# Patient Record
Sex: MALE | Race: WHITE | NOT HISPANIC OR LATINO | Employment: OTHER | URBAN - METROPOLITAN AREA
[De-identification: names, ages, dates, MRNs, and addresses within clinical notes are randomized per-mention and may not be internally consistent; named-entity substitution may affect disease eponyms.]

---

## 2017-10-24 ENCOUNTER — APPOINTMENT (OUTPATIENT)
Dept: SOCIAL WORK | Facility: CLINIC | Age: 72
End: 2017-10-24

## 2017-10-24 PROCEDURE — 90670 PCV13 VACCINE IM: CPT | Performed by: REGISTERED NURSE

## 2017-10-24 PROCEDURE — 90662 IIV NO PRSV INCREASED AG IM: CPT | Performed by: REGISTERED NURSE

## 2020-02-18 ENCOUNTER — HOSPITAL ENCOUNTER (OUTPATIENT)
Dept: HOSPITAL 8 - OUT | Age: 75
Discharge: HOME | End: 2020-02-18
Attending: ORTHOPAEDIC SURGERY
Payer: MEDICARE

## 2020-02-18 VITALS — BODY MASS INDEX: 30.31 KG/M2 | HEIGHT: 68 IN | WEIGHT: 199.96 LBS

## 2020-02-18 VITALS — DIASTOLIC BLOOD PRESSURE: 85 MMHG | SYSTOLIC BLOOD PRESSURE: 125 MMHG

## 2020-02-18 DIAGNOSIS — S83.242A: Primary | ICD-10-CM
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
## 2020-02-18 DIAGNOSIS — M17.12: ICD-10-CM
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
## 2020-02-18 DIAGNOSIS — M65.862: ICD-10-CM

## 2020-02-18 DIAGNOSIS — M94.8X6: ICD-10-CM
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
## 2020-02-18 LAB
ALBUMIN SERPL-MCNC: 3.6 G/DL (ref 3.4–5)
ALP SERPL-CCNC: 65 U/L (ref 45–117)
ALT SERPL-CCNC: 35 U/L (ref 12–78)
ANION GAP SERPL CALC-SCNC: 7 MMOL/L (ref 5–15)
BILIRUB SERPL-MCNC: 0.7 MG/DL (ref 0.2–1)
CALCIUM SERPL-MCNC: 8.4 MG/DL (ref 8.5–10.1)
CHLORIDE SERPL-SCNC: 105 MMOL/L (ref 98–107)
CREAT SERPL-MCNC: 1.05 MG/DL (ref 0.7–1.3)
PROT SERPL-MCNC: 7.6 G/DL (ref 6.4–8.2)

## 2020-02-18 PROCEDURE — 29881 ARTHRS KNE SRG MNISECTMY M/L: CPT

## 2020-02-18 PROCEDURE — 88300 SURGICAL PATH GROSS: CPT

## 2020-02-18 PROCEDURE — 80053 COMPREHEN METABOLIC PANEL: CPT

## 2020-02-18 PROCEDURE — 36415 COLL VENOUS BLD VENIPUNCTURE: CPT

## 2020-02-18 PROCEDURE — 93005 ELECTROCARDIOGRAM TRACING: CPT

## 2021-01-15 DIAGNOSIS — Z23 NEED FOR VACCINATION: ICD-10-CM

## 2021-02-16 ENCOUNTER — IMMUNIZATION (OUTPATIENT)
Dept: FAMILY PLANNING/WOMEN'S HEALTH CLINIC | Facility: IMMUNIZATION CENTER | Age: 76
End: 2021-02-16
Attending: INTERNAL MEDICINE

## 2021-02-16 DIAGNOSIS — Z23 ENCOUNTER FOR VACCINATION: Primary | ICD-10-CM

## 2021-02-16 DIAGNOSIS — Z23 NEED FOR VACCINATION: ICD-10-CM

## 2021-02-16 PROCEDURE — 91300 PFIZER SARS-COV-2 VACCINE: CPT | Performed by: INTERNAL MEDICINE

## 2021-02-16 PROCEDURE — 0001A PFIZER SARS-COV-2 VACCINE: CPT | Performed by: INTERNAL MEDICINE

## 2021-03-06 ENCOUNTER — IMMUNIZATION (OUTPATIENT)
Dept: FAMILY PLANNING/WOMEN'S HEALTH CLINIC | Facility: IMMUNIZATION CENTER | Age: 76
End: 2021-03-06
Attending: INTERNAL MEDICINE
Payer: COMMERCIAL

## 2021-03-06 DIAGNOSIS — Z23 ENCOUNTER FOR VACCINATION: Primary | ICD-10-CM

## 2021-03-06 PROCEDURE — 91300 PFIZER SARS-COV-2 VACCINE: CPT

## 2021-03-06 PROCEDURE — 0002A PFIZER SARS-COV-2 VACCINE: CPT

## 2021-03-09 ENCOUNTER — APPOINTMENT (OUTPATIENT)
Dept: LAB | Facility: MEDICAL CENTER | Age: 76
End: 2021-03-09
Payer: COMMERCIAL

## 2021-04-06 ENCOUNTER — HOSPITAL ENCOUNTER (OUTPATIENT)
Dept: LAB | Facility: MEDICAL CENTER | Age: 76
End: 2021-04-06
Attending: INTERNAL MEDICINE
Payer: COMMERCIAL

## 2021-04-06 LAB — CREAT UR-MCNC: 147.34 MG/DL

## 2021-04-06 PROCEDURE — 84443 ASSAY THYROID STIM HORMONE: CPT

## 2021-04-06 PROCEDURE — 36415 COLL VENOUS BLD VENIPUNCTURE: CPT

## 2021-04-06 PROCEDURE — 82570 ASSAY OF URINE CREATININE: CPT

## 2021-04-06 PROCEDURE — 80053 COMPREHEN METABOLIC PANEL: CPT

## 2021-04-06 PROCEDURE — 84100 ASSAY OF PHOSPHORUS: CPT

## 2021-04-06 PROCEDURE — 84481 FREE ASSAY (FT-3): CPT

## 2021-04-06 PROCEDURE — 83036 HEMOGLOBIN GLYCOSYLATED A1C: CPT

## 2021-04-06 PROCEDURE — 84439 ASSAY OF FREE THYROXINE: CPT

## 2021-04-06 PROCEDURE — 84105 ASSAY OF URINE PHOSPHORUS: CPT

## 2021-04-06 PROCEDURE — 82340 ASSAY OF CALCIUM IN URINE: CPT

## 2021-04-07 LAB
ALBUMIN SERPL BCP-MCNC: 4.1 G/DL (ref 3.2–4.9)
ALBUMIN/GLOB SERPL: 1.5 G/DL
ALP SERPL-CCNC: 62 U/L (ref 30–99)
ALT SERPL-CCNC: 26 U/L (ref 2–50)
ANION GAP SERPL CALC-SCNC: 9 MMOL/L (ref 7–16)
AST SERPL-CCNC: 22 U/L (ref 12–45)
BILIRUB SERPL-MCNC: 0.6 MG/DL (ref 0.1–1.5)
BUN SERPL-MCNC: 15 MG/DL (ref 8–22)
CALCIUM SERPL-MCNC: 9.1 MG/DL (ref 8.5–10.5)
CHLORIDE SERPL-SCNC: 102 MMOL/L (ref 96–112)
CO2 SERPL-SCNC: 27 MMOL/L (ref 20–33)
CREAT SERPL-MCNC: 0.93 MG/DL (ref 0.5–1.4)
EST. AVERAGE GLUCOSE BLD GHB EST-MCNC: 131 MG/DL
GLOBULIN SER CALC-MCNC: 2.8 G/DL (ref 1.9–3.5)
GLUCOSE SERPL-MCNC: 93 MG/DL (ref 65–99)
HBA1C MFR BLD: 6.2 % (ref 4–5.6)
PHOSPHATE SERPL-MCNC: 3 MG/DL (ref 2.5–4.5)
POTASSIUM SERPL-SCNC: 4.8 MMOL/L (ref 3.6–5.5)
PROT SERPL-MCNC: 6.9 G/DL (ref 6–8.2)
SODIUM SERPL-SCNC: 138 MMOL/L (ref 135–145)
T3FREE SERPL-MCNC: 2.71 PG/ML (ref 2–4.4)
T4 FREE SERPL-MCNC: 1.63 NG/DL (ref 0.93–1.7)
TSH SERPL DL<=0.005 MIU/L-ACNC: 0.94 UIU/ML (ref 0.38–5.33)

## 2021-04-08 LAB
CALCIUM 24H UR-MCNC: 12.9 MG/DL
CALCIUM 24H UR-MRATE: NORMAL MG/D
CALCIUM/CREAT 24H UR: 91 MG/G (ref 20–240)
COLLECT DURATION TIME SPEC: NORMAL HRS
COLLECT DURATION TIME SPEC: NORMAL HRS
CREAT 24H UR-MCNC: 141 MG/DL
CREAT 24H UR-MRATE: NORMAL MG/D (ref 800–2100)
PHOSPHATE 24H UR-MCNC: 59 MG/DL
PHOSPHATE 24H UR-MRATE: NORMAL MG/D (ref 400–1300)
PHOSPHATE/CREAT 24H UR: 418 MG/G
SPECIMEN VOL ?TM UR: NORMAL ML
TOTAL VOLUME 1105: NORMAL ML

## 2021-07-01 ENCOUNTER — HOSPITAL ENCOUNTER (OUTPATIENT)
Dept: LAB | Facility: MEDICAL CENTER | Age: 76
End: 2021-07-01
Attending: INTERNAL MEDICINE
Payer: COMMERCIAL

## 2021-07-01 LAB
EST. AVERAGE GLUCOSE BLD GHB EST-MCNC: 117 MG/DL
HBA1C MFR BLD: 5.7 % (ref 4–5.6)

## 2021-07-01 PROCEDURE — 83036 HEMOGLOBIN GLYCOSYLATED A1C: CPT

## 2021-07-01 PROCEDURE — 36415 COLL VENOUS BLD VENIPUNCTURE: CPT

## 2021-10-26 ENCOUNTER — HOSPITAL ENCOUNTER (OUTPATIENT)
Dept: LAB | Facility: MEDICAL CENTER | Age: 76
End: 2021-10-26
Attending: INTERNAL MEDICINE
Payer: COMMERCIAL

## 2021-10-26 LAB
ALBUMIN SERPL BCP-MCNC: 4 G/DL (ref 3.2–4.9)
ALBUMIN/GLOB SERPL: 1.4 G/DL
ALP SERPL-CCNC: 58 U/L (ref 30–99)
ALT SERPL-CCNC: 34 U/L (ref 2–50)
ANION GAP SERPL CALC-SCNC: 10 MMOL/L (ref 7–16)
AST SERPL-CCNC: 21 U/L (ref 12–45)
BILIRUB SERPL-MCNC: 0.7 MG/DL (ref 0.1–1.5)
BUN SERPL-MCNC: 18 MG/DL (ref 8–22)
CALCIUM SERPL-MCNC: 8.6 MG/DL (ref 8.5–10.5)
CHLORIDE SERPL-SCNC: 100 MMOL/L (ref 96–112)
CO2 SERPL-SCNC: 27 MMOL/L (ref 20–33)
CREAT SERPL-MCNC: 1.03 MG/DL (ref 0.5–1.4)
EST. AVERAGE GLUCOSE BLD GHB EST-MCNC: 117 MG/DL
GLOBULIN SER CALC-MCNC: 2.8 G/DL (ref 1.9–3.5)
GLUCOSE SERPL-MCNC: 95 MG/DL (ref 65–99)
HBA1C MFR BLD: 5.7 % (ref 4–5.6)
POTASSIUM SERPL-SCNC: 4.4 MMOL/L (ref 3.6–5.5)
PROT SERPL-MCNC: 6.8 G/DL (ref 6–8.2)
SODIUM SERPL-SCNC: 137 MMOL/L (ref 135–145)

## 2021-10-26 PROCEDURE — 80053 COMPREHEN METABOLIC PANEL: CPT

## 2021-10-26 PROCEDURE — 36415 COLL VENOUS BLD VENIPUNCTURE: CPT

## 2021-10-26 PROCEDURE — 83036 HEMOGLOBIN GLYCOSYLATED A1C: CPT

## 2022-01-26 ENCOUNTER — HOSPITAL ENCOUNTER (OUTPATIENT)
Dept: LAB | Facility: MEDICAL CENTER | Age: 77
End: 2022-01-26
Attending: INTERNAL MEDICINE
Payer: MEDICARE

## 2022-01-26 LAB
ALBUMIN SERPL BCP-MCNC: 4.4 G/DL (ref 3.2–4.9)
ALBUMIN/GLOB SERPL: 1.8 G/DL
ALP SERPL-CCNC: 66 U/L (ref 30–99)
ALT SERPL-CCNC: 34 U/L (ref 2–50)
ANION GAP SERPL CALC-SCNC: 14 MMOL/L (ref 7–16)
AST SERPL-CCNC: 26 U/L (ref 12–45)
BILIRUB SERPL-MCNC: 0.4 MG/DL (ref 0.1–1.5)
BUN SERPL-MCNC: 17 MG/DL (ref 8–22)
CALCIUM SERPL-MCNC: 8.6 MG/DL (ref 8.5–10.5)
CHLORIDE SERPL-SCNC: 99 MMOL/L (ref 96–112)
CO2 SERPL-SCNC: 24 MMOL/L (ref 20–33)
CREAT SERPL-MCNC: 1.02 MG/DL (ref 0.5–1.4)
EST. AVERAGE GLUCOSE BLD GHB EST-MCNC: 114 MG/DL
GLOBULIN SER CALC-MCNC: 2.5 G/DL (ref 1.9–3.5)
GLUCOSE SERPL-MCNC: 92 MG/DL (ref 65–99)
HBA1C MFR BLD: 5.6 % (ref 4–5.6)
POTASSIUM SERPL-SCNC: 4.3 MMOL/L (ref 3.6–5.5)
PROT SERPL-MCNC: 6.9 G/DL (ref 6–8.2)
SODIUM SERPL-SCNC: 137 MMOL/L (ref 135–145)

## 2022-01-26 PROCEDURE — 36415 COLL VENOUS BLD VENIPUNCTURE: CPT

## 2022-01-26 PROCEDURE — 83036 HEMOGLOBIN GLYCOSYLATED A1C: CPT | Mod: GA

## 2022-01-26 PROCEDURE — 80053 COMPREHEN METABOLIC PANEL: CPT

## 2022-05-11 ENCOUNTER — HOSPITAL ENCOUNTER (OUTPATIENT)
Dept: LAB | Facility: MEDICAL CENTER | Age: 77
End: 2022-05-11
Attending: INTERNAL MEDICINE
Payer: MEDICARE

## 2022-05-11 PROCEDURE — 82570 ASSAY OF URINE CREATININE: CPT

## 2022-05-11 PROCEDURE — 84300 ASSAY OF URINE SODIUM: CPT

## 2022-05-11 PROCEDURE — 36415 COLL VENOUS BLD VENIPUNCTURE: CPT | Mod: GA

## 2022-05-11 PROCEDURE — 84100 ASSAY OF PHOSPHORUS: CPT

## 2022-05-11 PROCEDURE — 80053 COMPREHEN METABOLIC PANEL: CPT

## 2022-05-11 PROCEDURE — 86141 C-REACTIVE PROTEIN HS: CPT | Mod: GA

## 2022-05-11 PROCEDURE — 84133 ASSAY OF URINE POTASSIUM: CPT

## 2022-05-11 PROCEDURE — 84105 ASSAY OF URINE PHOSPHORUS: CPT

## 2022-05-11 PROCEDURE — 82340 ASSAY OF CALCIUM IN URINE: CPT

## 2022-05-12 LAB
ALBUMIN SERPL BCP-MCNC: 4 G/DL (ref 3.2–4.9)
ALBUMIN/GLOB SERPL: 1.4 G/DL
ALP SERPL-CCNC: 58 U/L (ref 30–99)
ALT SERPL-CCNC: 31 U/L (ref 2–50)
ANION GAP SERPL CALC-SCNC: 10 MMOL/L (ref 7–16)
AST SERPL-CCNC: 25 U/L (ref 12–45)
BILIRUB SERPL-MCNC: 0.6 MG/DL (ref 0.1–1.5)
BUN SERPL-MCNC: 18 MG/DL (ref 8–22)
CALCIUM SERPL-MCNC: 8.7 MG/DL (ref 8.5–10.5)
CHLORIDE SERPL-SCNC: 102 MMOL/L (ref 96–112)
CO2 SERPL-SCNC: 27 MMOL/L (ref 20–33)
CREAT SERPL-MCNC: 1.02 MG/DL (ref 0.5–1.4)
CREAT UR-MCNC: 104.35 MG/DL
CRP SERPL HS-MCNC: 3.2 MG/L (ref 0–3)
GFR SERPLBLD CREATININE-BSD FMLA CKD-EPI: 76 ML/MIN/1.73 M 2
GLOBULIN SER CALC-MCNC: 2.9 G/DL (ref 1.9–3.5)
GLUCOSE SERPL-MCNC: 106 MG/DL (ref 65–99)
PHOSPHATE SERPL-MCNC: 4 MG/DL (ref 2.5–4.5)
POTASSIUM SERPL-SCNC: 5 MMOL/L (ref 3.6–5.5)
POTASSIUM UR-SCNC: 73 MMOL/L
PROT SERPL-MCNC: 6.9 G/DL (ref 6–8.2)
SODIUM SERPL-SCNC: 139 MMOL/L (ref 135–145)
SODIUM UR-SCNC: 118 MMOL/L

## 2022-05-14 LAB
CALCIUM 24H UR-MCNC: 14.1 MG/DL
CALCIUM 24H UR-MRATE: NORMAL MG/D (ref 100–250)
CALCIUM/CREAT 24H UR: 131 MG/G (ref 20–240)
COLLECT DURATION TIME SPEC: NORMAL HRS
COLLECT DURATION TIME SPEC: NORMAL HRS
CREAT 24H UR-MCNC: 108 MG/DL
CREAT 24H UR-MRATE: NORMAL MG/D (ref 800–2100)
PHOSPHATE 24H UR-MCNC: 45 MG/DL
PHOSPHATE 24H UR-MRATE: NORMAL MG/D (ref 400–1300)
PHOSPHATE/CREAT 24H UR: 417 MG/G
SPECIMEN VOL ?TM UR: NORMAL ML
TOTAL VOLUME 1105: NORMAL ML

## 2022-09-08 ENCOUNTER — HOSPITAL ENCOUNTER (OUTPATIENT)
Dept: LAB | Facility: MEDICAL CENTER | Age: 77
End: 2022-09-08
Attending: INTERNAL MEDICINE
Payer: MEDICARE

## 2022-09-08 LAB
EST. AVERAGE GLUCOSE BLD GHB EST-MCNC: 114 MG/DL
HBA1C MFR BLD: 5.6 % (ref 4–5.6)
T3FREE SERPL-MCNC: 2.52 PG/ML (ref 2–4.4)
T4 FREE SERPL-MCNC: 1.68 NG/DL (ref 0.93–1.7)
TSH SERPL DL<=0.005 MIU/L-ACNC: 2.22 UIU/ML (ref 0.38–5.33)

## 2022-09-08 PROCEDURE — 83036 HEMOGLOBIN GLYCOSYLATED A1C: CPT | Mod: GA

## 2022-09-08 PROCEDURE — 84439 ASSAY OF FREE THYROXINE: CPT

## 2022-09-08 PROCEDURE — 36415 COLL VENOUS BLD VENIPUNCTURE: CPT | Mod: GA

## 2022-09-08 PROCEDURE — 84481 FREE ASSAY (FT-3): CPT

## 2022-09-08 PROCEDURE — 84443 ASSAY THYROID STIM HORMONE: CPT

## 2022-11-03 ENCOUNTER — HOSPITAL ENCOUNTER (OUTPATIENT)
Dept: LAB | Facility: MEDICAL CENTER | Age: 77
End: 2022-11-03
Attending: INTERNAL MEDICINE
Payer: MEDICARE

## 2022-11-03 LAB
ALBUMIN SERPL BCP-MCNC: 4.1 G/DL (ref 3.2–4.9)
ALBUMIN/GLOB SERPL: 1.3 G/DL
ALP SERPL-CCNC: 62 U/L (ref 30–99)
ALT SERPL-CCNC: 41 U/L (ref 2–50)
ANION GAP SERPL CALC-SCNC: 12 MMOL/L (ref 7–16)
AST SERPL-CCNC: 27 U/L (ref 12–45)
BILIRUB SERPL-MCNC: 0.5 MG/DL (ref 0.1–1.5)
BUN SERPL-MCNC: 19 MG/DL (ref 8–22)
CALCIUM SERPL-MCNC: 9.1 MG/DL (ref 8.5–10.5)
CHLORIDE SERPL-SCNC: 98 MMOL/L (ref 96–112)
CO2 SERPL-SCNC: 27 MMOL/L (ref 20–33)
CREAT SERPL-MCNC: 1.23 MG/DL (ref 0.5–1.4)
CREAT UR-MCNC: 184.62 MG/DL
GFR SERPLBLD CREATININE-BSD FMLA CKD-EPI: 61 ML/MIN/1.73 M 2
GLOBULIN SER CALC-MCNC: 3.1 G/DL (ref 1.9–3.5)
GLUCOSE SERPL-MCNC: 111 MG/DL (ref 65–99)
PHOSPHATE SERPL-MCNC: 3.4 MG/DL (ref 2.5–4.5)
POTASSIUM SERPL-SCNC: 4.1 MMOL/L (ref 3.6–5.5)
POTASSIUM UR-SCNC: 68 MMOL/L
PROT SERPL-MCNC: 7.2 G/DL (ref 6–8.2)
PTH-INTACT SERPL-MCNC: 33.4 PG/ML (ref 14–72)
SODIUM SERPL-SCNC: 137 MMOL/L (ref 135–145)
SODIUM UR-SCNC: 108 MMOL/L

## 2022-11-03 PROCEDURE — 82570 ASSAY OF URINE CREATININE: CPT

## 2022-11-03 PROCEDURE — 82340 ASSAY OF CALCIUM IN URINE: CPT

## 2022-11-03 PROCEDURE — 84100 ASSAY OF PHOSPHORUS: CPT

## 2022-11-03 PROCEDURE — 84300 ASSAY OF URINE SODIUM: CPT

## 2022-11-03 PROCEDURE — 80053 COMPREHEN METABOLIC PANEL: CPT

## 2022-11-03 PROCEDURE — 82088 ASSAY OF ALDOSTERONE: CPT

## 2022-11-03 PROCEDURE — 83970 ASSAY OF PARATHORMONE: CPT

## 2022-11-03 PROCEDURE — 84105 ASSAY OF URINE PHOSPHORUS: CPT

## 2022-11-03 PROCEDURE — 36415 COLL VENOUS BLD VENIPUNCTURE: CPT

## 2022-11-03 PROCEDURE — 84133 ASSAY OF URINE POTASSIUM: CPT

## 2022-11-04 ENCOUNTER — PATIENT MESSAGE (OUTPATIENT)
Dept: HEALTH INFORMATION MANAGEMENT | Facility: OTHER | Age: 77
End: 2022-11-04

## 2022-11-05 LAB
ALDOST SERPL-MCNC: 8.2 NG/DL
CALCIUM 24H UR-MCNC: 20.7 MG/DL
CALCIUM 24H UR-MRATE: NORMAL MG/D (ref 100–250)
CALCIUM/CREAT 24H UR: 102 MG/G (ref 20–240)
COLLECT DURATION TIME SPEC: NORMAL HRS
COLLECT DURATION TIME SPEC: NORMAL HRS
CREAT 24H UR-MCNC: 203 MG/DL
CREAT 24H UR-MRATE: NORMAL MG/D (ref 800–2100)
PHOSPHATE 24H UR-MCNC: 80 MG/DL
PHOSPHATE 24H UR-MRATE: NORMAL MG/D (ref 400–1300)
PHOSPHATE/CREAT 24H UR: 394 MG/G
SPECIMEN VOL ?TM UR: NORMAL ML
TOTAL VOLUME 1105: NORMAL ML

## 2022-11-24 ENCOUNTER — HOSPITAL ENCOUNTER (OUTPATIENT)
Facility: MEDICAL CENTER | Age: 77
End: 2022-11-24
Attending: FAMILY MEDICINE
Payer: MEDICARE

## 2022-11-24 ENCOUNTER — OFFICE VISIT (OUTPATIENT)
Dept: URGENT CARE | Facility: CLINIC | Age: 77
End: 2022-11-24
Payer: MEDICARE

## 2022-11-24 VITALS
WEIGHT: 200 LBS | HEART RATE: 60 BPM | HEIGHT: 68 IN | SYSTOLIC BLOOD PRESSURE: 146 MMHG | TEMPERATURE: 97.4 F | BODY MASS INDEX: 30.31 KG/M2 | RESPIRATION RATE: 16 BRPM | OXYGEN SATURATION: 98 % | DIASTOLIC BLOOD PRESSURE: 66 MMHG

## 2022-11-24 DIAGNOSIS — R42 DIZZINESS: ICD-10-CM

## 2022-11-24 LAB
ALBUMIN SERPL BCP-MCNC: 4.4 G/DL (ref 3.2–4.9)
ALBUMIN/GLOB SERPL: 1.5 G/DL
ALP SERPL-CCNC: 63 U/L (ref 30–99)
ALT SERPL-CCNC: 36 U/L (ref 2–50)
ANION GAP SERPL CALC-SCNC: 12 MMOL/L (ref 7–16)
APPEARANCE UR: CLEAR
AST SERPL-CCNC: 34 U/L (ref 12–45)
BASOPHILS # BLD AUTO: 0.3 % (ref 0–1.8)
BASOPHILS # BLD: 0.02 K/UL (ref 0–0.12)
BILIRUB SERPL-MCNC: 0.5 MG/DL (ref 0.1–1.5)
BILIRUB UR STRIP-MCNC: NEGATIVE MG/DL
BUN SERPL-MCNC: 17 MG/DL (ref 8–22)
CALCIUM SERPL-MCNC: 9.5 MG/DL (ref 8.5–10.5)
CHLORIDE SERPL-SCNC: 98 MMOL/L (ref 96–112)
CO2 SERPL-SCNC: 25 MMOL/L (ref 20–33)
COLOR UR AUTO: NORMAL
CREAT SERPL-MCNC: 1.06 MG/DL (ref 0.5–1.4)
EOSINOPHIL # BLD AUTO: 0.03 K/UL (ref 0–0.51)
EOSINOPHIL NFR BLD: 0.5 % (ref 0–6.9)
ERYTHROCYTE [DISTWIDTH] IN BLOOD BY AUTOMATED COUNT: 48.3 FL (ref 35.9–50)
GFR SERPLBLD CREATININE-BSD FMLA CKD-EPI: 73 ML/MIN/1.73 M 2
GLOBULIN SER CALC-MCNC: 2.9 G/DL (ref 1.9–3.5)
GLUCOSE SERPL-MCNC: 98 MG/DL (ref 65–99)
GLUCOSE UR STRIP.AUTO-MCNC: NEGATIVE MG/DL
HCT VFR BLD AUTO: 45.8 % (ref 42–52)
HGB BLD-MCNC: 16 G/DL (ref 14–18)
IMM GRANULOCYTES # BLD AUTO: 0.02 K/UL (ref 0–0.11)
IMM GRANULOCYTES NFR BLD AUTO: 0.3 % (ref 0–0.9)
KETONES UR STRIP.AUTO-MCNC: NEGATIVE MG/DL
LEUKOCYTE ESTERASE UR QL STRIP.AUTO: NEGATIVE
LYMPHOCYTES # BLD AUTO: 1.67 K/UL (ref 1–4.8)
LYMPHOCYTES NFR BLD: 27.2 % (ref 22–41)
MCH RBC QN AUTO: 34.7 PG (ref 27–33)
MCHC RBC AUTO-ENTMCNC: 34.9 G/DL (ref 33.7–35.3)
MCV RBC AUTO: 99.3 FL (ref 81.4–97.8)
MONOCYTES # BLD AUTO: 0.55 K/UL (ref 0–0.85)
MONOCYTES NFR BLD AUTO: 9 % (ref 0–13.4)
NEUTROPHILS # BLD AUTO: 3.84 K/UL (ref 1.82–7.42)
NEUTROPHILS NFR BLD: 62.7 % (ref 44–72)
NITRITE UR QL STRIP.AUTO: NEGATIVE
NRBC # BLD AUTO: 0 K/UL
NRBC BLD-RTO: 0 /100 WBC
NT-PROBNP SERPL IA-MCNC: 59 PG/ML (ref 0–125)
PH UR STRIP.AUTO: 6 [PH] (ref 5–8)
PLATELET # BLD AUTO: 260 K/UL (ref 164–446)
PMV BLD AUTO: 9.8 FL (ref 9–12.9)
POTASSIUM SERPL-SCNC: 4.3 MMOL/L (ref 3.6–5.5)
PROT SERPL-MCNC: 7.3 G/DL (ref 6–8.2)
PROT UR QL STRIP: NEGATIVE MG/DL
RBC # BLD AUTO: 4.61 M/UL (ref 4.7–6.1)
RBC UR QL AUTO: NEGATIVE
SODIUM SERPL-SCNC: 135 MMOL/L (ref 135–145)
SP GR UR STRIP.AUTO: 1.02
UROBILINOGEN UR STRIP-MCNC: 0.2 MG/DL
WBC # BLD AUTO: 6.1 K/UL (ref 4.8–10.8)

## 2022-11-24 PROCEDURE — 99204 OFFICE O/P NEW MOD 45 MIN: CPT | Performed by: FAMILY MEDICINE

## 2022-11-24 PROCEDURE — 83880 ASSAY OF NATRIURETIC PEPTIDE: CPT | Mod: GA

## 2022-11-24 PROCEDURE — 85025 COMPLETE CBC W/AUTO DIFF WBC: CPT

## 2022-11-24 PROCEDURE — 80053 COMPREHEN METABOLIC PANEL: CPT

## 2022-11-24 PROCEDURE — 81002 URINALYSIS NONAUTO W/O SCOPE: CPT | Performed by: FAMILY MEDICINE

## 2022-11-24 RX ORDER — CLONIDINE HYDROCHLORIDE 0.1 MG/1
0.1 TABLET ORAL
COMMUNITY
Start: 2010-01-01

## 2022-11-24 RX ORDER — VALSARTAN AND HYDROCHLOROTHIAZIDE 80; 12.5 MG/1; MG/1
1 TABLET, FILM COATED ORAL DAILY
COMMUNITY
Start: 2010-01-01

## 2022-11-24 RX ORDER — CALCITONIN SALMON 200 [IU]/.09ML
SPRAY, METERED NASAL
COMMUNITY
Start: 2022-10-12 | End: 2023-01-04

## 2022-11-24 RX ORDER — LEVOTHYROXINE SODIUM 137 UG/1
137 TABLET ORAL
COMMUNITY
Start: 1983-01-01

## 2022-11-24 RX ORDER — ERGOCALCIFEROL 1.25 MG/1
50000 CAPSULE ORAL
COMMUNITY
Start: 2010-01-01

## 2022-11-24 ASSESSMENT — ENCOUNTER SYMPTOMS
ABDOMINAL PAIN: 0
COUGH: 0
PALPITATIONS: 0
NAUSEA: 0
FEVER: 0
HEADACHES: 0
DIZZINESS: 1
VOMITING: 0
SORE THROAT: 0

## 2022-11-24 NOTE — PROGRESS NOTES
"Subjective:     Paco Amaya is a 77 y.o. male who presents for Dizziness (X2 days, )    HPI  Pt presents for evaluation of an acute problem  Pt with some dizziness the past 2 days   Feels his balance is poor   No sensation that the room is spinning and has no visual changes   Patient has a hard time describing his \"dizzy\" sensation  States he \"just does not feel right\"  Patient chronically has tinnitus unilaterally with no hearing loss  Patient saw PCP about the same problem yesterday and suggested it may be vertigo  Had EKG which was normal  Had point-of-care glucose which was 106  Was told to take over-the-counter meclizine    Review of Systems   Constitutional:  Negative for fever.   HENT:  Negative for sore throat.    Respiratory:  Negative for cough.    Cardiovascular:  Negative for chest pain and palpitations.   Gastrointestinal:  Negative for abdominal pain, nausea and vomiting.   Skin:  Negative for rash.   Neurological:  Positive for dizziness. Negative for headaches.     PMH:  has no past medical history on file.  MEDS:   Current Outpatient Medications:     levothyroxine (SYNTHROID) 137 MCG Tab, , Disp: , Rfl:     valsartan-hydrochlorothiazide (DIOVAN-HCT) 80-12.5 MG per tablet, , Disp: , Rfl:     ergocalciferol (DRISDOL) 75474 UNIT capsule, , Disp: , Rfl:     cloNIDine (CATAPRES) 0.1 MG Tab, , Disp: , Rfl:     calcitonin, salmon, (MIACALCIN) 200 UNIT/ACT Solution, SPRAY ONCE IN ONE NOSTRIL ONCE NIGHTLY, Disp: , Rfl:   ALLERGIES:   Allergies   Allergen Reactions    Clindamycin     Penicillins      SURGHX: History reviewed. No pertinent surgical history.  SOCHX:  reports that he has never smoked. He has never used smokeless tobacco.     Objective:   BP (!) 146/66 (BP Location: Left arm, Patient Position: Sitting, BP Cuff Size: Adult)   Pulse 60   Temp 36.3 °C (97.4 °F) (Temporal)   Resp 16   Ht 1.727 m (5' 8\")   Wt 90.7 kg (200 lb)   SpO2 98%   BMI 30.41 kg/m²     Physical " Exam  Constitutional:       General: He is not in acute distress.     Appearance: He is well-developed. He is not diaphoretic.   HENT:      Head: Normocephalic and atraumatic.      Right Ear: Tympanic membrane, ear canal and external ear normal.      Left Ear: Tympanic membrane, ear canal and external ear normal.      Nose: Nose normal.      Mouth/Throat:      Mouth: Mucous membranes are moist.      Pharynx: Oropharynx is clear. No oropharyngeal exudate or posterior oropharyngeal erythema.   Neck:      Trachea: No tracheal deviation.   Cardiovascular:      Rate and Rhythm: Normal rate and regular rhythm.      Heart sounds: Normal heart sounds.   Pulmonary:      Effort: Pulmonary effort is normal. No respiratory distress.      Breath sounds: Normal breath sounds. No wheezing or rales.   Musculoskeletal:         General: Normal range of motion.      Cervical back: Normal range of motion and neck supple. No tenderness.   Lymphadenopathy:      Cervical: No cervical adenopathy.   Skin:     General: Skin is warm and dry.      Findings: No rash.   Neurological:      Mental Status: He is alert.   Psychiatric:         Behavior: Behavior normal.         Thought Content: Thought content normal.         Judgment: Judgment normal.       Assessment/Plan:   Assessment    1. Dizziness  - CBC WITH DIFFERENTIAL; Future  - Comp Metabolic Panel; Future  - proBrain Natriuretic Peptide, NT; Future  - POCT Urinalysis    Other orders  - levothyroxine (SYNTHROID) 137 MCG Tab  - valsartan-hydrochlorothiazide (DIOVAN-HCT) 80-12.5 MG per tablet  - ergocalciferol (DRISDOL) 49397 UNIT capsule  - cloNIDine (CATAPRES) 0.1 MG Tab  - calcitonin, salmon, (MIACALCIN) 200 UNIT/ACT Solution; SPRAY ONCE IN ONE NOSTRIL ONCE NIGHTLY    Patient with intermittent dizziness and fatigue.  Had some work-up with his PCP including point-of-care glucose, EKG, and physical exam which were all within normal limits.  Here in office today, orthostatics blood pressures  normal, point-of-care urinalysis normal, and recommended further evaluation with blood testing.  Differential could include BPPV, Ménière's, anemia, intermittent A. fib, or electrolyte imbalance.  Patient agreeable and was given strict ER precautions if symptoms should worsen at all.

## 2022-11-29 ENCOUNTER — TELEPHONE (OUTPATIENT)
Dept: URGENT CARE | Facility: CLINIC | Age: 77
End: 2022-11-29

## 2022-11-29 NOTE — TELEPHONE ENCOUNTER
Lab called stating that the icd-10 codes need to be changed on the labs that were ordered due to pt's insurance.

## 2022-11-30 ENCOUNTER — TELEPHONE (OUTPATIENT)
Dept: SPORTS MEDICINE | Facility: CLINIC | Age: 77
End: 2022-11-30
Payer: MEDICARE

## 2022-11-30 NOTE — LETTER
November 30, 2022        To Whom It May Concern:             This is confirmation that Paco Amaya attended an appointment with Werner Tanner M.D. on 11/24/22 at the [facility].  At the time, he was evaluated for dizziness and underwent testing.  He was advised that he should not be traveling, should not be exposed to any elevation changes, and was advised to stay home until his test results were completed the next day.         Sincerely,          Werner Tanner M.D.  [phone]

## 2022-11-30 NOTE — TELEPHONE ENCOUNTER
Called patient to discuss lab results.  Patient's symptoms are stable and not worsening.  Since our visit in urgent care, he has actually been in the ER and followed up with his PCP.  He had a brain MRI which was within normal limits and did not show stroke.  He was given referral to both cardiology and ENT.  All questions answered and he is happy with his current work-up being done.

## 2022-12-29 ENCOUNTER — TELEPHONE (OUTPATIENT)
Dept: HEALTH INFORMATION MANAGEMENT | Facility: OTHER | Age: 77
End: 2022-12-29
Payer: MEDICARE

## 2023-01-04 ENCOUNTER — OFFICE VISIT (OUTPATIENT)
Dept: CARDIOLOGY | Facility: MEDICAL CENTER | Age: 78
End: 2023-01-04
Payer: MEDICARE

## 2023-01-04 ENCOUNTER — NON-PROVIDER VISIT (OUTPATIENT)
Dept: CARDIOLOGY | Facility: MEDICAL CENTER | Age: 78
End: 2023-01-04
Payer: MEDICARE

## 2023-01-04 VITALS
OXYGEN SATURATION: 97 % | DIASTOLIC BLOOD PRESSURE: 80 MMHG | BODY MASS INDEX: 31.07 KG/M2 | SYSTOLIC BLOOD PRESSURE: 140 MMHG | HEART RATE: 66 BPM | HEIGHT: 68 IN | WEIGHT: 205 LBS | RESPIRATION RATE: 16 BRPM

## 2023-01-04 DIAGNOSIS — I10 ESSENTIAL HYPERTENSION, BENIGN: ICD-10-CM

## 2023-01-04 DIAGNOSIS — I44.0 FIRST DEGREE HEART BLOCK: ICD-10-CM

## 2023-01-04 DIAGNOSIS — R42 DIZZINESS: ICD-10-CM

## 2023-01-04 DIAGNOSIS — I47.10 SVT (SUPRAVENTRICULAR TACHYCARDIA) (HCC): ICD-10-CM

## 2023-01-04 DIAGNOSIS — I49.3 PVC'S (PREMATURE VENTRICULAR CONTRACTIONS): ICD-10-CM

## 2023-01-04 PROBLEM — E03.8 OTHER SPECIFIED HYPOTHYROIDISM: Status: ACTIVE | Noted: 2023-01-04

## 2023-01-04 LAB — EKG IMPRESSION: NORMAL

## 2023-01-04 PROCEDURE — 93000 ELECTROCARDIOGRAM COMPLETE: CPT | Performed by: INTERNAL MEDICINE

## 2023-01-04 PROCEDURE — 99203 OFFICE O/P NEW LOW 30 MIN: CPT

## 2023-01-04 RX ORDER — MECLIZINE HYDROCHLORIDE 25 MG/1
TABLET ORAL
COMMUNITY
Start: 2022-11-26 | End: 2023-01-04

## 2023-01-04 ASSESSMENT — ENCOUNTER SYMPTOMS
EYES NEGATIVE: 1
PALPITATIONS: 0
ORTHOPNEA: 0
BACK PAIN: 1
PND: 0
CONSTITUTIONAL NEGATIVE: 1
MYALGIAS: 1
DIZZINESS: 1
GASTROINTESTINAL NEGATIVE: 1
SHORTNESS OF BREATH: 0
DEPRESSION: 0
NERVOUS/ANXIOUS: 1
NECK PAIN: 1

## 2023-01-04 ASSESSMENT — FIBROSIS 4 INDEX: FIB4 SCORE: 1.66

## 2023-01-04 NOTE — PROGRESS NOTES
Patient enrolled in the 14 day Zio XT Holter moitoring program, per Ambreen Garcia A.P.R.N.  >In clinic hook up, monitor S/N [id number].  >Pending EOS.

## 2023-01-04 NOTE — PROGRESS NOTES
"Chief Complaint   Patient presents with    Abnormal EKG     NP Dx: Abnormal electrocardiogram (ECG) (EKG)       Subjective     Paco Amaya is a 77 y.o. male who presents today for evaluation of a \"nonspecific EKG changes\" and dizziness. They have a history of hypertension, hypothyroidism, and tinnitus.    They are a new patient    Patient states that dizziness first began this fall.  On 11/23/2022 he went to his PCP because he had been feeling dizzy and lightheaded and \"drunk ithout euphoria\" and this caused him significant nervousness and anxiety.  His primary care provider sent him to the urgent care where he was told to go to the ER if it became worse.  On 11/25/2022 he went to the ER at [place] because symptoms had not improved.  There he had an EKG done which showed a first-degree heart block and an MRI which was negative.  Since the ER he has seen an ENT had a \"hearing test done that was negative.\"  He does have further testing planned on 01/24/2022 with the ENT but does not remember what test it is.  He reports that his dizziness is worse when he is moving around and after he finishes exercising.    He has about 3.5 drinks alcohol per day, tobacco use, uses marijuana about 2 times a week.  He has not noticed an association between his drinking and his feelings of dizziness.    Higher to this dizziness he was working out frequently.    Diet: overeats, lots of salty foods. Some fruit and vegetables, less than ideal    No symptoms of chest pain, palpitations, shortness of breath, exercise intolerance, dyspnea, or lower extremity edema.    No past medical history on file.  No past surgical history on file.  No family history on file.  Social History     Socioeconomic History    Marital status:      Spouse name: Not on file    Number of children: Not on file    Years of education: Not on file    Highest education level: Not on file   Occupational History    Not on file   Tobacco Use    Smoking " status: Former     Types: Cigarettes     Quit date: 1981     Years since quittin.0    Smokeless tobacco: Never   Substance and Sexual Activity    Alcohol use: Yes     Alcohol/week: 16.8 oz     Types: 14 Glasses of wine, 14 Shots of liquor per week    Drug use: Yes     Types: Marijuana    Sexual activity: Not on file   Other Topics Concern    Not on file   Social History Narrative    Not on file     Social Determinants of Health     Financial Resource Strain: Not on file   Food Insecurity: Not on file   Transportation Needs: Not on file   Physical Activity: Not on file   Stress: Not on file   Social Connections: Not on file   Intimate Partner Violence: Not on file   Housing Stability: Not on file     Allergies   Allergen Reactions    Clindamycin     Penicillins      Outpatient Encounter Medications as of 2023   Medication Sig Dispense Refill    MAGNESIUM PO Take  by mouth.      levothyroxine (SYNTHROID) 137 MCG Tab       valsartan-hydrochlorothiazide (DIOVAN-HCT) 80-12.5 MG per tablet       ergocalciferol (DRISDOL) 29578 UNIT capsule       cloNIDine (CATAPRES) 0.1 MG Tab       [DISCONTINUED] meclizine (ANTIVERT) 25 MG Tab TAKE 1 TABLET (25 MG TOTAL) BY MOUTH 3 (THREE) TIMES A DAY AS NEEDED FOR DIZZINESS FOR UP TO 10 DAYS (Patient not taking: Reported on 2023)      [DISCONTINUED] calcitonin, salmon, (MIACALCIN) 200 UNIT/ACT Solution SPRAY ONCE IN ONE NOSTRIL ONCE NIGHTLY       No facility-administered encounter medications on file as of 2023.     Review of Systems   Constitutional: Negative.    HENT:  Positive for tinnitus.    Eyes: Negative.    Respiratory:  Negative for shortness of breath.    Cardiovascular:  Negative for chest pain, palpitations, orthopnea, leg swelling and PND.   Gastrointestinal: Negative.    Genitourinary:  Positive for frequency.   Musculoskeletal:  Positive for back pain, joint pain, myalgias and neck pain.   Skin: Negative.    Neurological:  Positive for dizziness.  "  Endo/Heme/Allergies: Negative.    Psychiatric/Behavioral:  Negative for depression. The patient is nervous/anxious.             Objective     BP (!) 140/80 (BP Location: Left arm, Patient Position: Standing, BP Cuff Size: Adult)   Pulse 66   Resp 16   Ht 1.727 m (5' 8\")   Wt 93 kg (205 lb)   SpO2 97%   BMI 31.17 kg/m²     Orthostatics: Supine 130/84, sitting 140/90, standing 140/80    Physical Exam  Constitutional:       Appearance: Normal appearance.   HENT:      Head: Normocephalic.   Neck:      Vascular: No JVD.   Cardiovascular:      Rate and Rhythm: Normal rate and regular rhythm.      Pulses: Normal pulses.      Heart sounds: Normal heart sounds. No murmur heard.    No friction rub.   Pulmonary:      Effort: Pulmonary effort is normal.      Breath sounds: Normal breath sounds.   Abdominal:      Palpations: Abdomen is soft.   Musculoskeletal:         General: Normal range of motion.      Right lower leg: No edema.      Left lower leg: No edema.   Skin:     General: Skin is warm and dry.   Neurological:      General: No focal deficit present.      Mental Status: He is alert and oriented to person, place, and time.   Psychiatric:         Mood and Affect: Mood normal.         Behavior: Behavior normal.          Lab Results   Component Value Date/Time    CHOLSTRLTOT 197 10/09/2012 07:33 AM     10/09/2012 07:33 AM    HDL 40 10/09/2012 07:33 AM    TRIGLYCERIDE 94 10/09/2012 07:33 AM       Lab Results   Component Value Date/Time    SODIUM 135 11/24/2022 02:07 PM    POTASSIUM 4.3 11/24/2022 02:07 PM    CHLORIDE 98 11/24/2022 02:07 PM    CO2 25 11/24/2022 02:07 PM    GLUCOSE 98 11/24/2022 02:07 PM    BUN 17 11/24/2022 02:07 PM    CREATININE 1.06 11/24/2022 02:07 PM     Lab Results   Component Value Date/Time    ALKPHOSPHAT 63 11/24/2022 02:07 PM    ASTSGOT 34 11/24/2022 02:07 PM    ALTSGPT 36 11/24/2022 02:07 PM    TBILIRUBIN 0.5 11/24/2022 02:07 PM      MRI 11/25/2022  FINDINGS:   Brain:  No mass.  No " hemorrhage.  No restricted diffusion.  Mild   chronic microvascular ischemic changes.   Ventricles:  No hydrocephalus.   Bones joints:  Unremarkable.   Sinuses:  No acute sinusitis.   Mastoid air cells:  No effusion.   Orbits:  Unremarkable.    EKG 01/04/2023  SR with 1st degree HB rate 64. 0.279/0.097/0.424    Assessment & Plan     1. Dizziness  Cardiac Event Monitor      2. Essential hypertension, benign        3. First degree heart block  EKG    Cardiac Event Monitor          Medical Decision Making: Today's Assessment/Status/Plan:        Dizziness  -Recent negative MRI  -Followed by ENT  -Orthostatics negative  -No recent medication changes  -Low suspicion that this is a cardiac issue  -Order placed for cardiac monitor to evaluate for the presence of an associated arrhythmia  -She does not wish to get an echocardiogram at this point    Hypertension  - poor control  - continue clonidine and valsartan-hydrochlorothiazide  -No changes at this time to medication regimen due to ongoing dizziness.  - goal < 130/80    First-degree heart block  -Noted on today's EKG as well as EKG at [place] [facility]    Follow-up with CAITLIN Cadet in 1 month with cardiac monitor.    This note was dictated using Dragon speech recognition software.

## 2023-01-23 ENCOUNTER — TELEPHONE (OUTPATIENT)
Dept: CARDIOLOGY | Facility: MEDICAL CENTER | Age: 78
End: 2023-01-23
Payer: MEDICARE

## 2023-02-08 ENCOUNTER — OFFICE VISIT (OUTPATIENT)
Dept: CARDIOLOGY | Facility: MEDICAL CENTER | Age: 78
End: 2023-02-08
Payer: MEDICARE

## 2023-02-08 VITALS
DIASTOLIC BLOOD PRESSURE: 74 MMHG | OXYGEN SATURATION: 98 % | RESPIRATION RATE: 16 BRPM | SYSTOLIC BLOOD PRESSURE: 122 MMHG | HEIGHT: 68 IN | HEART RATE: 60 BPM | WEIGHT: 206 LBS | BODY MASS INDEX: 31.22 KG/M2

## 2023-02-08 DIAGNOSIS — I10 ESSENTIAL HYPERTENSION, BENIGN: ICD-10-CM

## 2023-02-08 DIAGNOSIS — I44.0 FIRST DEGREE HEART BLOCK: ICD-10-CM

## 2023-02-08 DIAGNOSIS — R42 DIZZINESS: ICD-10-CM

## 2023-02-08 PROCEDURE — 99213 OFFICE O/P EST LOW 20 MIN: CPT

## 2023-02-08 PROCEDURE — 93246 EXT ECG>7D<15D RECORDING: CPT | Performed by: INTERNAL MEDICINE

## 2023-02-08 PROCEDURE — 93248 EXT ECG>7D<15D REV&INTERPJ: CPT | Performed by: INTERNAL MEDICINE

## 2023-02-08 ASSESSMENT — FIBROSIS 4 INDEX: FIB4 SCORE: 1.66

## 2023-02-08 ASSESSMENT — ENCOUNTER SYMPTOMS
NECK PAIN: 1
DEPRESSION: 0
NERVOUS/ANXIOUS: 1
PALPITATIONS: 0
EYES NEGATIVE: 1
PND: 0
MYALGIAS: 1
SHORTNESS OF BREATH: 0
GASTROINTESTINAL NEGATIVE: 1
DIZZINESS: 1
ORTHOPNEA: 0
CONSTITUTIONAL NEGATIVE: 1
BACK PAIN: 1

## 2023-02-08 NOTE — PROGRESS NOTES
"Chief Complaint   Patient presents with    Follow-Up     Dx: First degree heart block      Hypertension     F/V Dx: Essential hypertension, benign    Dizziness       Subjective     Paco Amaya is a 77 y.o. male who presents today for evaluation of a follow up of their zio monitor. They have a history of hypertension, hypothyroidism, dizziness and tinnitus.    At today's visit 2/8/2023: Dizziness had been better, comes and goes. He had gotten used to it, not affecting his quality of life as much anymore, able to exercise.    ENT testing: he had a balance test, results are pending    On 1/4/2023: Patient states that dizziness first began this fall.  On 11/23/2022 he went to his PCP because he had been feeling dizzy and lightheaded and \"drunk without euphoria\" and this caused him significant nervousness and anxiety.  His primary care provider sent him to the urgent care where he was told to go to the ER if it became worse.  On 11/25/2022 he went to the ER at [place] because symptoms had not improved.  There he had an EKG done which showed a first-degree heart block and an MRI which was negative.  Since the ER he has seen an ENT had a \"hearing test done that was negative.\"  He does have further testing planned on 01/24/2022 with the ENT but does not remember what test it is.  He reports that his dizziness is worse when he is moving around and after he finishes exercising.    He has about 3.5 drinks alcohol per day, tobacco use, uses marijuana about 2 times a week.  He has not noticed an association between his drinking and his feelings of dizziness.    Diet: overeats, lots of salty foods. Some fruit and vegetables, less than ideal    No symptoms of chest pain, palpitations, shortness of breath, exercise intolerance, dyspnea, or lower extremity edema.    History reviewed. No pertinent past medical history.  History reviewed. No pertinent surgical history.  History reviewed. No pertinent family " history.  Social History     Socioeconomic History    Marital status:      Spouse name: Not on file    Number of children: Not on file    Years of education: Not on file    Highest education level: Not on file   Occupational History    Not on file   Tobacco Use    Smoking status: Former     Types: Cigarettes     Quit date: 1981     Years since quittin.1    Smokeless tobacco: Never   Substance and Sexual Activity    Alcohol use: Yes     Alcohol/week: 16.8 oz     Types: 14 Glasses of wine, 14 Shots of liquor per week    Drug use: Yes     Types: Marijuana    Sexual activity: Not on file   Other Topics Concern    Not on file   Social History Narrative    Not on file     Social Determinants of Health     Financial Resource Strain: Not on file   Food Insecurity: Not on file   Transportation Needs: Not on file   Physical Activity: Not on file   Stress: Not on file   Social Connections: Not on file   Intimate Partner Violence: Not on file   Housing Stability: Not on file     Allergies   Allergen Reactions    Clindamycin     Penicillins      Outpatient Encounter Medications as of 2023   Medication Sig Dispense Refill    MAGNESIUM PO Take  by mouth.      levothyroxine (SYNTHROID) 137 MCG Tab Take 137 mcg by mouth every morning on an empty stomach.      valsartan-hydrochlorothiazide (DIOVAN-HCT) 80-12.5 MG per tablet Take 1 Tablet by mouth every day.      ergocalciferol (DRISDOL) 70251 UNIT capsule Take 50,000 Units by mouth Q30 DAYS.      cloNIDine (CATAPRES) 0.1 MG Tab Take 0.1 mg by mouth at bedtime.       No facility-administered encounter medications on file as of 2023.     Review of Systems   Constitutional: Negative.    HENT:  Positive for tinnitus.    Eyes: Negative.    Respiratory:  Negative for shortness of breath.    Cardiovascular:  Negative for chest pain, palpitations, orthopnea, leg swelling and PND.   Gastrointestinal: Negative.    Genitourinary:  Positive for frequency.  "  Musculoskeletal:  Positive for back pain, joint pain, myalgias and neck pain.   Skin: Negative.    Neurological:  Positive for dizziness.   Endo/Heme/Allergies: Negative.    Psychiatric/Behavioral:  Negative for depression. The patient is nervous/anxious.             Objective     /74 (BP Location: Left arm, Patient Position: Sitting, BP Cuff Size: Adult)   Pulse 60   Resp 16   Ht 1.727 m (5' 8\")   Wt 93.4 kg (206 lb)   SpO2 98%   BMI 31.32 kg/m²     Orthostatics from 1/4/2023: Supine 130/84, sitting 140/90, standing 140/80    Physical Exam  Constitutional:       Appearance: Normal appearance. He is obese.   HENT:      Head: Normocephalic.   Neck:      Vascular: No JVD.   Cardiovascular:      Rate and Rhythm: Normal rate and regular rhythm.      Pulses: Normal pulses.      Heart sounds: Normal heart sounds. No murmur heard.    No friction rub.   Pulmonary:      Effort: Pulmonary effort is normal.      Breath sounds: Normal breath sounds.   Abdominal:      Palpations: Abdomen is soft.   Musculoskeletal:         General: Normal range of motion.      Right lower leg: No edema.      Left lower leg: No edema.   Skin:     General: Skin is warm and dry.   Neurological:      General: No focal deficit present.      Mental Status: He is alert and oriented to person, place, and time.   Psychiatric:         Mood and Affect: Mood normal.         Behavior: Behavior normal.          Lab Results   Component Value Date/Time    CHOLSTRLTOT 197 10/09/2012 07:33 AM     10/09/2012 07:33 AM    HDL 40 10/09/2012 07:33 AM    TRIGLYCERIDE 94 10/09/2012 07:33 AM       Lab Results   Component Value Date/Time    SODIUM 135 11/24/2022 02:07 PM    POTASSIUM 4.3 11/24/2022 02:07 PM    CHLORIDE 98 11/24/2022 02:07 PM    CO2 25 11/24/2022 02:07 PM    GLUCOSE 98 11/24/2022 02:07 PM    BUN 17 11/24/2022 02:07 PM    CREATININE 1.06 11/24/2022 02:07 PM     Lab Results   Component Value Date/Time    ALKPHOSPHAT 63 11/24/2022 02:07 " "PM    ASTSGOT 34 11/24/2022 02:07 PM    ALTSGPT 36 11/24/2022 02:07 PM    TBILIRUBIN 0.5 11/24/2022 02:07 PM      MRI 11/25/2022  FINDINGS:   Brain:  No mass.  No hemorrhage.  No restricted diffusion.  Mild   chronic microvascular ischemic changes.   Ventricles:  No hydrocephalus.   Bones joints:  Unremarkable.   Sinuses:  No acute sinusitis.   Mastoid air cells:  No effusion.   Orbits:  Unremarkable.    EKG 01/04/2023  SR with 1st degree HB rate 64. 0.279/0.097/0.424    Cardiac Monitor 1/23/2023  No VT, VF or Pauses.   No atrial fibrillation or flutter.   No pauses     Triggered events correspond to:   1.  Nonsustained SVT lasting the longest 17.1 seconds.   2.  Occasional PVCs   3.  Sinus rhythm without ectopy     Physician Interpretation: Normal event recorder with occasional PVCs and nonsustained SVT       Assessment & Plan     1. Dizziness        2. Essential hypertension, benign        3. First degree heart block            Medical Decision Making: Today's Assessment/Status/Plan:        Dizziness  -Recent negative MRI  -Followed by ENT  -Orthostatics negative at last visit  -No recent medication changes  -Low suspicion that this is a cardiac issue  -Cardiac monitor showed: \"normal event recorder with occasional PVCs and nonsustained SVT\"    -he does not wish to get an echocardiogram at this point     Hypertension  - good control  - continue clonidine and valsartan-hydrochlorothiazide  -No changes at this time to medication regimen due to ongoing dizziness.  - goal < 130/80    First-degree heart block  -Present on EKG and event monitor    No follow up needed at this time, patient can be managed by PCP and ENT.    This note was dictated using Dragon speech recognition software.                  "

## 2023-06-16 ENCOUNTER — OFFICE VISIT (OUTPATIENT)
Dept: URGENT CARE | Facility: CLINIC | Age: 78
End: 2023-06-16
Payer: MEDICARE

## 2023-06-16 VITALS
DIASTOLIC BLOOD PRESSURE: 70 MMHG | SYSTOLIC BLOOD PRESSURE: 124 MMHG | BODY MASS INDEX: 31.07 KG/M2 | HEIGHT: 68 IN | TEMPERATURE: 97 F | HEART RATE: 72 BPM | OXYGEN SATURATION: 99 % | WEIGHT: 205 LBS | RESPIRATION RATE: 16 BRPM

## 2023-06-16 DIAGNOSIS — R42 DIZZINESS: ICD-10-CM

## 2023-06-16 DIAGNOSIS — I10 ESSENTIAL HYPERTENSION, BENIGN: ICD-10-CM

## 2023-06-16 DIAGNOSIS — K59.00 CONSTIPATION, UNSPECIFIED CONSTIPATION TYPE: ICD-10-CM

## 2023-06-16 PROCEDURE — 93000 ELECTROCARDIOGRAM COMPLETE: CPT | Performed by: STUDENT IN AN ORGANIZED HEALTH CARE EDUCATION/TRAINING PROGRAM

## 2023-06-16 PROCEDURE — 3074F SYST BP LT 130 MM HG: CPT | Performed by: STUDENT IN AN ORGANIZED HEALTH CARE EDUCATION/TRAINING PROGRAM

## 2023-06-16 PROCEDURE — 99214 OFFICE O/P EST MOD 30 MIN: CPT | Performed by: STUDENT IN AN ORGANIZED HEALTH CARE EDUCATION/TRAINING PROGRAM

## 2023-06-16 PROCEDURE — 3078F DIAST BP <80 MM HG: CPT | Performed by: STUDENT IN AN ORGANIZED HEALTH CARE EDUCATION/TRAINING PROGRAM

## 2023-06-16 ASSESSMENT — ENCOUNTER SYMPTOMS
VISUAL CHANGE: 0
VOMITING: 0
NAUSEA: 0
NUMBNESS: 0
DIZZINESS: 1
SORE THROAT: 0
WEAKNESS: 0
FATIGUE: 0
HEADACHES: 0
ABDOMINAL PAIN: 0
CHILLS: 0
FEVER: 0

## 2023-06-16 ASSESSMENT — FIBROSIS 4 INDEX: FIB4 SCORE: 1.66

## 2023-06-16 NOTE — PROGRESS NOTES
"Subjective     Paco Amaya is a 77 y.o. male who presents with Hypertension (Today, high blood pressure, dizziness, body chills, constipation.)            Paco is a 77 y.o. male who presents to urgent care for elevated blood pressure and dizziness He is also experiencing chills and constipation.  Patient states he has had symptoms of dizziness on and off for months.  Patient states he was here back in November for dizziness and states he has never had resolution of symptoms. He has been to the ER for similar symptoms in the past as well as has had follow up with cardiology and endocrinology. He states his cardiologist is \"stumped\" and cardiologist found no cardiac abnormalities. Patient only presents to urgent care today due to elevated BP at home.  Patient reports elevated blood pressure of 160s/70s at home.  States he took clonidine due to elevated blood pressure.  Patient states he usually takes clonidine at night but took it in the afternoon today due to elevated BP at home.  Patient states he becomes anxious when blood pressure rises.  Patient also unsure why he keeps getting recurrent dizziness.  No chest pain, shortness of breath or difficulty breathing.  No nausea or vomiting.  No change in vision.    Dizziness  This is a recurrent problem. The problem has been waxing and waning. Pertinent negatives include no abdominal pain, chest pain, chills, congestion, fatigue, fever, headaches, nausea, numbness, rash, sore throat, visual change, vomiting or weakness.       Review of Systems   Constitutional:  Negative for chills, fatigue and fever.   HENT:  Negative for congestion and sore throat.    Cardiovascular:  Negative for chest pain.   Gastrointestinal:  Negative for abdominal pain, nausea and vomiting.   Skin:  Negative for rash.   Neurological:  Positive for dizziness. Negative for weakness, numbness and headaches.              Objective     There were no vitals taken for this visit.     Physical " "Exam  Vitals reviewed.   Constitutional:       General: He is not in acute distress.     Appearance: Normal appearance. He is not ill-appearing, toxic-appearing or diaphoretic.   HENT:      Head: Normocephalic and atraumatic.      Nose: Nose normal.      Mouth/Throat:      Mouth: Mucous membranes are moist.      Pharynx: Oropharynx is clear.   Eyes:      Extraocular Movements: Extraocular movements intact.      Conjunctiva/sclera: Conjunctivae normal.      Pupils: Pupils are equal, round, and reactive to light.   Cardiovascular:      Rate and Rhythm: Normal rate and regular rhythm.   Pulmonary:      Effort: Pulmonary effort is normal.      Breath sounds: Normal breath sounds.   Abdominal:      General: Abdomen is flat. Bowel sounds are normal.      Palpations: Abdomen is soft.   Skin:     General: Skin is warm and dry.   Neurological:      General: No focal deficit present.      Mental Status: He is alert and oriented to person, place, and time. Mental status is at baseline.      GCS: GCS eye subscore is 4. GCS verbal subscore is 5. GCS motor subscore is 6.      Cranial Nerves: Cranial nerves 2-12 are intact.      Sensory: Sensation is intact.      Motor: Motor function is intact.      Coordination: Coordination is intact.      Gait: Gait is intact.                             Assessment & Plan        1. Essential hypertension, benign  - BP in clinic 124/170.  - Advised  to monitor BP at home, BP log and follow-up with PCP.    2. Dizziness  - EKG - Clinic Performed (scanned into patient's chart)  - EKG: Sinus rhythm, no ST elevation, prolonged MI interval. Prolonged MI interval seen on prior EKGs.  - No recent medication changes.  - Past medical history significant for dizziness.  Per cardiology note on 2/8/2023: Negative MRI, negative orthostatics, cardiac monitor showed normal event recorder with occasional PVCs and nonsustained SVT.\"  Patient declined echocardiogram.  -Low suspicion of cardiac issue per " radiology.  - Advised patient to follow-up with cardiology.  - Strict ER precautions discussed.    3. Constipation, unspecified constipation type  - Patient report BM today. Can continue to take MiraLAX per  instructions.  Advised on increased hydration and increase fiber in diet.    I personally reviewed prior external notes and test results pertinent to today's visit.    Differential diagnoses, supportive care, and indications for immediate follow-up discussed with patient. Pathogenesis of diagnosis discussed including typical length and natural progression.      My total time spent caring for the patient on the day of the encounter was 35 minutes including reviewing prior external notes/test results pertinent to today's visit, obtaining patient history, physical exam, discussing plan of care, appropriate follow-up and indications for immediate follow-up. This does not include time spent on separately billable procedures/tests.     Instructed to return to urgent care or nearest emergency department if symptoms fail to improve, for any change in condition, further concerns, or new concerning symptoms.    Patient states understanding and agrees with the plan of   care and discharge instructions.

## 2023-06-20 ENCOUNTER — TELEPHONE (OUTPATIENT)
Dept: URGENT CARE | Facility: CLINIC | Age: 78
End: 2023-06-20
Payer: MEDICARE

## 2023-11-29 ENCOUNTER — PATIENT MESSAGE (OUTPATIENT)
Dept: HEALTH INFORMATION MANAGEMENT | Facility: OTHER | Age: 78
End: 2023-11-29

## 2024-04-04 ENCOUNTER — OFFICE VISIT (OUTPATIENT)
Dept: URGENT CARE | Facility: CLINIC | Age: 79
End: 2024-04-04
Payer: MEDICARE

## 2024-04-04 VITALS
HEART RATE: 77 BPM | DIASTOLIC BLOOD PRESSURE: 80 MMHG | WEIGHT: 207.8 LBS | SYSTOLIC BLOOD PRESSURE: 142 MMHG | RESPIRATION RATE: 16 BRPM | BODY MASS INDEX: 31.49 KG/M2 | TEMPERATURE: 96.8 F | HEIGHT: 68 IN | OXYGEN SATURATION: 97 %

## 2024-04-04 DIAGNOSIS — S51.851A CAT BITE OF RIGHT FOREARM WITH INFECTION, INITIAL ENCOUNTER: ICD-10-CM
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
## 2024-04-04 DIAGNOSIS — L08.9 CAT BITE OF RIGHT FOREARM WITH INFECTION, INITIAL ENCOUNTER: ICD-10-CM

## 2024-04-04 PROCEDURE — 90471 IMMUNIZATION ADMIN: CPT | Performed by: PHYSICIAN ASSISTANT

## 2024-04-04 PROCEDURE — 3079F DIAST BP 80-89 MM HG: CPT | Performed by: PHYSICIAN ASSISTANT

## 2024-04-04 PROCEDURE — 3077F SYST BP >= 140 MM HG: CPT | Performed by: PHYSICIAN ASSISTANT

## 2024-04-04 PROCEDURE — 99213 OFFICE O/P EST LOW 20 MIN: CPT | Mod: 25 | Performed by: PHYSICIAN ASSISTANT

## 2024-04-04 PROCEDURE — 90714 TD VACC NO PRESV 7 YRS+ IM: CPT | Performed by: PHYSICIAN ASSISTANT

## 2024-04-04 RX ORDER — METRONIDAZOLE 500 MG/1
500 TABLET ORAL 3 TIMES DAILY
Qty: 21 TABLET | Refills: 0 | Status: SHIPPED | OUTPATIENT
Start: 2024-04-04 | End: 2024-04-11

## 2024-04-04 RX ORDER — DOXYCYCLINE HYCLATE 100 MG
100 TABLET ORAL 2 TIMES DAILY
Qty: 14 TABLET | Refills: 0 | Status: SHIPPED | OUTPATIENT
Start: 2024-04-04 | End: 2024-04-11

## 2024-04-04 ASSESSMENT — FIBROSIS 4 INDEX: FIB4 SCORE: 1.68

## 2024-04-04 NOTE — PROGRESS NOTES
"Subjective:   Paco Amaya is a 78 y.o. male who presents for Cat Bite (Cat bite on the right forearm two days ago. Red, swollen, and painful. Unsure who's cat it was.)        Patient presents with concerns of cat bite wound to the right forearm that occurred 2 days ago.  Over the last 2 days patient has developed moderate pain, redness, and swelling.  No numbness and tingling in the upper extremity.  Date of last tetanus shot unknown.  The cat is a \"neighborhood cat.\"  He is unsure of who it belongs to.  The cat is friendly and affectionate and often follows the patient into his home.  He picked up the cat to remove it from the house, prompting him to bite his forearm.  No nausea, vomiting, fevers, chills.      Review of Systems   Constitutional:  Negative for chills and fever.   Gastrointestinal:  Negative for nausea and vomiting.   Neurological:  Negative for tingling.       PMH:  has no past medical history on file.  MEDS:   Current Outpatient Medications:     doxycycline (VIBRAMYCIN) 100 MG Tab, Take 1 Tablet by mouth 2 times a day for 7 days., Disp: 14 Tablet, Rfl: 0    metroNIDAZOLE (FLAGYL) 500 MG Tab, Take 1 Tablet by mouth 3 times a day for 7 days., Disp: 21 Tablet, Rfl: 0    NON SPECIFIED, Pidglitazone 15 mg, Disp: , Rfl:     MAGNESIUM PO, Take  by mouth., Disp: , Rfl:     levothyroxine (SYNTHROID) 137 MCG Tab, Take 137 mcg by mouth every morning on an empty stomach., Disp: , Rfl:     valsartan-hydrochlorothiazide (DIOVAN-HCT) 80-12.5 MG per tablet, Take 1 Tablet by mouth every day., Disp: , Rfl:     ergocalciferol (DRISDOL) 04289 UNIT capsule, Take 50,000 Units by mouth Q30 DAYS., Disp: , Rfl:     cloNIDine (CATAPRES) 0.1 MG Tab, Take 0.1 mg by mouth at bedtime., Disp: , Rfl:   ALLERGIES:   Allergies   Allergen Reactions    Clindamycin     Penicillins      SURGHX: History reviewed. No pertinent surgical history.  SOCHX:  reports that he quit smoking about 43 years ago. His smoking use included " "cigarettes. He has never used smokeless tobacco. He reports current alcohol use of about 16.8 oz of alcohol per week. He reports current drug use. Drug: Marijuana.  FH: Family history was reviewed, no pertinent findings to report   Objective:   BP (!) 142/80 (BP Location: Left arm, Patient Position: Sitting, BP Cuff Size: Large adult)   Pulse 77   Temp 36 °C (96.8 °F) (Temporal)   Resp 16   Ht 1.727 m (5' 8\")   Wt 94.3 kg (207 lb 12.8 oz)   SpO2 97%   BMI 31.60 kg/m²   Physical Exam  Vitals reviewed.   Constitutional:       General: He is not in acute distress.     Appearance: Normal appearance. He is well-developed. He is not toxic-appearing.   HENT:      Head: Normocephalic and atraumatic.      Right Ear: External ear normal.      Left Ear: External ear normal.      Nose: Nose normal.   Cardiovascular:      Rate and Rhythm: Normal rate and regular rhythm.   Pulmonary:      Effort: Pulmonary effort is normal. No respiratory distress.      Breath sounds: No stridor.   Skin:     General: Skin is dry.             Comments: Patient has several small puncture wounds of the right mid volar forearm with surrounding erythema and edema.  Mildly, diffusely tender to palpation.  Moderate warmth.  No crepitus. No lymphangitis.  Radial, median, ulnar nerves intact.  Radial pulse 2+.   Neurological:      Comments: Alert and oriented.    Psychiatric:         Speech: Speech normal.         Behavior: Behavior normal.           Assessment/Plan:   1. Cat bite of right forearm with infection, initial encounter  - TD Preservative Free =>8yo IM  - doxycycline (VIBRAMYCIN) 100 MG Tab; Take 1 Tablet by mouth 2 times a day for 7 days.  Dispense: 14 Tablet; Refill: 0  - metroNIDAZOLE (FLAGYL) 500 MG Tab; Take 1 Tablet by mouth 3 times a day for 7 days.  Dispense: 21 Tablet; Refill: 0    Exam today consistent with cellulitis secondary to cat bite.  Patient started on doxycycline and metronidazole.  TD also updated today.  I would like " patient to return to clinic on Saturday morning to have the wound rechecked.  If he develops any rapidly progressing symptoms or signs of systemic infection I would like him to go to the emergency room for reevaluation.

## 2024-04-05 ASSESSMENT — ENCOUNTER SYMPTOMS
FEVER: 0
TINGLING: 0
NAUSEA: 0
CHILLS: 0
VOMITING: 0

## 2024-04-06 ENCOUNTER — OFFICE VISIT (OUTPATIENT)
Dept: URGENT CARE | Facility: CLINIC | Age: 79
End: 2024-04-06
Payer: MEDICARE

## 2024-04-06 VITALS
TEMPERATURE: 97.4 F | SYSTOLIC BLOOD PRESSURE: 122 MMHG | HEART RATE: 67 BPM | OXYGEN SATURATION: 97 % | WEIGHT: 207 LBS | BODY MASS INDEX: 31.37 KG/M2 | HEIGHT: 68 IN | DIASTOLIC BLOOD PRESSURE: 76 MMHG | RESPIRATION RATE: 16 BRPM

## 2024-04-06 DIAGNOSIS — S51.851D CAT BITE OF FOREARM, RIGHT, SUBSEQUENT ENCOUNTER: ICD-10-CM
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
## 2024-04-06 PROCEDURE — 3074F SYST BP LT 130 MM HG: CPT | Performed by: STUDENT IN AN ORGANIZED HEALTH CARE EDUCATION/TRAINING PROGRAM

## 2024-04-06 PROCEDURE — 3078F DIAST BP <80 MM HG: CPT | Performed by: STUDENT IN AN ORGANIZED HEALTH CARE EDUCATION/TRAINING PROGRAM

## 2024-04-06 PROCEDURE — 99213 OFFICE O/P EST LOW 20 MIN: CPT | Performed by: STUDENT IN AN ORGANIZED HEALTH CARE EDUCATION/TRAINING PROGRAM

## 2024-04-06 ASSESSMENT — ENCOUNTER SYMPTOMS
FEVER: 0
SENSORY CHANGE: 0
TINGLING: 0
CHILLS: 0

## 2024-04-06 ASSESSMENT — FIBROSIS 4 INDEX: FIB4 SCORE: 1.68

## 2024-04-06 NOTE — PROGRESS NOTES
"Subjective     Paco Amaya is a 78 y.o. male who presents with Animal Bite (X5days. R forearm. Cat bite follow up)            Paco is a 78 y.o. male who presents to urgent care for cat bite of right forearm.  Patient states he was seen 2 days ago in urgent care due to cat bite becoming infected.  Patient was started on 2 antibiotics at that visit.  Patient taking antibiotics as prescribed.  Patient states that he has noticed improvement of redness, swelling and pain to his right forearm.  He still reports some redness but does state it is improving.  Patient reports increased range of motion after starting antibiotics.  No fever/chills.        Review of Systems   Constitutional:  Negative for chills and fever.   Musculoskeletal:  Negative for joint pain.   Neurological:  Negative for tingling and sensory change.   All other systems reviewed and are negative.             Objective     /76   Pulse 67   Temp 36.3 °C (97.4 °F) (Temporal)   Resp 16   Ht 1.727 m (5' 8\")   Wt 93.9 kg (207 lb)   SpO2 97%   BMI 31.47 kg/m²      Physical Exam  Vitals reviewed.   Constitutional:       Appearance: Normal appearance.   HENT:      Head: Normocephalic and atraumatic.      Nose: Nose normal.      Mouth/Throat:      Mouth: Mucous membranes are moist.      Pharynx: Oropharynx is clear.   Eyes:      Extraocular Movements: Extraocular movements intact.      Conjunctiva/sclera: Conjunctivae normal.      Pupils: Pupils are equal, round, and reactive to light.   Cardiovascular:      Rate and Rhythm: Normal rate.   Pulmonary:      Effort: Pulmonary effort is normal.   Musculoskeletal:      Right elbow: Normal.      Right wrist: Normal.      Right hand: Normal.   Skin:     General: Skin is warm and dry.      Capillary Refill: Capillary refill takes less than 2 seconds.          Neurological:      General: No focal deficit present.      Mental Status: He is alert.                             Assessment & Plan      "   1. Cat bite of forearm, right, subsequent encounter  - Patient reports improvement of symptoms including improvement, redness, swelling and pain.  Patient is advised to complete antibiotics as prescribed.  Patient currently taking doxycycline and metronidazole which was prescribed on 4/4/2024 for cat bite with secondary cellulitis.  Overall improving with antibiotic therapy.    I personally reviewed prior external notes and test results pertinent to today's visit, including urgent care visit on 4/4/2024.    Supportive care measures, wound care instructions and indications for immediate follow-up discussed with patient. Pathogenesis of diagnosis discussed including typical length and natural progression.      Instructed to return to urgent care or nearest emergency department if symptoms fail to improve, for any change in condition, further concerns, or new concerning symptoms.    Patient states understanding and agrees with the plan of care and discharge instructions.        My total time spent caring for the patient on the day of the encounter was 20 minutes including reviewing prior external notes/test results pertinent to today's visit, obtaining patient history, physical exam, discussing plan of care, supportive care measures/wound care instructions, addressing patient's questions/concerns, and discussing appropriate follow-up and indications for immediate follow-up. This does not include time spent on separately billable procedures/tests.